# Patient Record
Sex: MALE | Race: WHITE | Employment: OTHER | URBAN - METROPOLITAN AREA
[De-identification: names, ages, dates, MRNs, and addresses within clinical notes are randomized per-mention and may not be internally consistent; named-entity substitution may affect disease eponyms.]

---

## 2017-10-26 ENCOUNTER — TELEPHONE (OUTPATIENT)
Dept: CARDIOLOGY CLINIC | Age: 67
End: 2017-10-26

## 2017-10-26 DIAGNOSIS — N28.9 RENAL INSUFFICIENCY: Chronic | ICD-10-CM

## 2017-10-26 DIAGNOSIS — E78.5 HYPERLIPIDEMIA, UNSPECIFIED HYPERLIPIDEMIA TYPE: Primary | Chronic | ICD-10-CM

## 2017-12-01 ENCOUNTER — OFFICE VISIT (OUTPATIENT)
Dept: CARDIOLOGY CLINIC | Age: 67
End: 2017-12-01

## 2017-12-01 VITALS
DIASTOLIC BLOOD PRESSURE: 70 MMHG | WEIGHT: 192.8 LBS | SYSTOLIC BLOOD PRESSURE: 124 MMHG | HEART RATE: 60 BPM | HEIGHT: 68 IN | BODY MASS INDEX: 29.22 KG/M2 | OXYGEN SATURATION: 97 %

## 2017-12-01 DIAGNOSIS — I25.10 CORONARY ARTERY DISEASE DUE TO LIPID RICH PLAQUE: Primary | ICD-10-CM

## 2017-12-01 DIAGNOSIS — Z95.5 STATUS POST INSERTION OF DRUG-ELUTING STENT INTO LEFT ANTERIOR DESCENDING ARTERY: ICD-10-CM

## 2017-12-01 DIAGNOSIS — Z95.5 STATUS POST INSERTION OF DRUG-ELUTING STENT INTO RIGHT CORONARY ARTERY FOR CORONARY ARTERY DISEASE: ICD-10-CM

## 2017-12-01 DIAGNOSIS — I25.83 CORONARY ARTERY DISEASE DUE TO LIPID RICH PLAQUE: Primary | ICD-10-CM

## 2017-12-01 DIAGNOSIS — E78.2 MIXED HYPERLIPIDEMIA: ICD-10-CM

## 2017-12-01 PROCEDURE — G8599 NO ASA/ANTIPLAT THER USE RNG: HCPCS | Performed by: INTERNAL MEDICINE

## 2017-12-01 PROCEDURE — 4040F PNEUMOC VAC/ADMIN/RCVD: CPT | Performed by: INTERNAL MEDICINE

## 2017-12-01 PROCEDURE — 3017F COLORECTAL CA SCREEN DOC REV: CPT | Performed by: INTERNAL MEDICINE

## 2017-12-01 PROCEDURE — G8419 CALC BMI OUT NRM PARAM NOF/U: HCPCS | Performed by: INTERNAL MEDICINE

## 2017-12-01 PROCEDURE — 1123F ACP DISCUSS/DSCN MKR DOCD: CPT | Performed by: INTERNAL MEDICINE

## 2017-12-01 PROCEDURE — G8427 DOCREV CUR MEDS BY ELIG CLIN: HCPCS | Performed by: INTERNAL MEDICINE

## 2017-12-01 PROCEDURE — 1036F TOBACCO NON-USER: CPT | Performed by: INTERNAL MEDICINE

## 2017-12-01 PROCEDURE — G8484 FLU IMMUNIZE NO ADMIN: HCPCS | Performed by: INTERNAL MEDICINE

## 2017-12-01 PROCEDURE — 99214 OFFICE O/P EST MOD 30 MIN: CPT | Performed by: INTERNAL MEDICINE

## 2017-12-01 RX ORDER — METOPROLOL SUCCINATE 25 MG/1
TABLET, EXTENDED RELEASE ORAL
Qty: 15 TABLET | Refills: 11
Start: 2017-12-01 | End: 2020-08-19 | Stop reason: ALTCHOICE

## 2017-12-01 RX ORDER — METOPROLOL SUCCINATE 25 MG/1
25 TABLET, EXTENDED RELEASE ORAL DAILY
COMMUNITY
End: 2017-12-01 | Stop reason: SDUPTHER

## 2017-12-01 NOTE — PROGRESS NOTES
(ZYLOPRIM) 300 MG tablet, Take 300 mg by mouth daily , Disp: , Rfl:     tadalafil (CIALIS) 5 MG tablet, Take 5 mg by mouth every other day, Disp: , Rfl:     atorvastatin (LIPITOR) 80 MG tablet, Take 1 tablet by mouth daily, Disp: 90 tablet, Rfl: 3    lisinopril (PRINIVIL;ZESTRIL) 2.5 MG tablet, Take 1 tablet by mouth daily, Disp: 30 tablet, Rfl: 11    RA KRILL  MG CAPS, Take 500 mg by mouth every morning sts takes few times a week, Disp: , Rfl:     aspirin 81 MG EC tablet, Take 1 tablet by mouth daily, Disp: 30 tablet, Rfl: 11    nitroGLYCERIN (NITROSTAT) 0.4 MG SL tablet, Place 1 tablet under the tongue every 5 minutes as needed for Chest pain, Disp: 25 tablet, Rfl: 3     Allergies:  Penicillins     Review of Systems:   A 10 point review of symptoms completed. Pertinent positives identified in the HPI, all other review of symptoms negative. Physical Examination:    Vitals:    12/01/17 1317   BP: 124/70   Pulse: 60   SpO2: 97%        Constitutional and General Appearance: NAD   Respiratory:  · Normal excursion and expansion without use of accessory muscles  · Resp Auscultation: Normal breath sounds without dullness  Cardiovascular:  · The apical impulses not displaced  · JVD is normal  · The carotid upstroke is normal in amplitude and contour without delay or bruit  · Normal S1S2, No S3, No Murmur  · Peripheral pulses are symmetrical and full  · There is no clubbing, cyanosis of the extremities. · No edema  · Pedal Pulses: 2+ and equal   Abdomen:  · No masses or tenderness  · Liver/Spleen: No Abnormalities Noted  Neurological/Psychiatric:  · Alert and oriented in all spheres  · Moves all extremities well  · Exhibits normal gait balance and coordination  · No abnormalities of mood, affect, memory, mentation, or behavior are noted    Echo 11/10/15:  Summary  Left ventricle size is normal. Normal left ventricular wall thickness. Ejection fraction is visually estimated to be 50-55 %.   Normal diastolic

## 2018-10-09 ENCOUNTER — APPOINTMENT (RX ONLY)
Dept: URBAN - METROPOLITAN AREA CLINIC 170 | Facility: CLINIC | Age: 68
Setting detail: DERMATOLOGY
End: 2018-10-09

## 2018-10-09 DIAGNOSIS — Z85.828 PERSONAL HISTORY OF OTHER MALIGNANT NEOPLASM OF SKIN: ICD-10-CM

## 2018-10-09 DIAGNOSIS — L81.4 OTHER MELANIN HYPERPIGMENTATION: ICD-10-CM

## 2018-10-09 DIAGNOSIS — D22 MELANOCYTIC NEVI: ICD-10-CM

## 2018-10-09 DIAGNOSIS — L82.1 OTHER SEBORRHEIC KERATOSIS: ICD-10-CM

## 2018-10-09 DIAGNOSIS — D18.0 HEMANGIOMA: ICD-10-CM

## 2018-10-09 PROBLEM — E78.5 HYPERLIPIDEMIA, UNSPECIFIED: Status: ACTIVE | Noted: 2018-10-09

## 2018-10-09 PROBLEM — D22.5 MELANOCYTIC NEVI OF TRUNK: Status: ACTIVE | Noted: 2018-10-09

## 2018-10-09 PROBLEM — D18.01 HEMANGIOMA OF SKIN AND SUBCUTANEOUS TISSUE: Status: ACTIVE | Noted: 2018-10-09

## 2018-10-09 PROBLEM — I10 ESSENTIAL (PRIMARY) HYPERTENSION: Status: ACTIVE | Noted: 2018-10-09

## 2018-10-09 PROCEDURE — ? SUNSCREEN RECOMMENDATIONS

## 2018-10-09 PROCEDURE — ? COUNSELING

## 2018-10-09 PROCEDURE — 99213 OFFICE O/P EST LOW 20 MIN: CPT

## 2018-10-09 ASSESSMENT — LOCATION DETAILED DESCRIPTION DERM
LOCATION DETAILED: INFERIOR THORACIC SPINE
LOCATION DETAILED: RIGHT LATERAL ABDOMEN
LOCATION DETAILED: RIGHT MID-UPPER BACK
LOCATION DETAILED: EPIGASTRIC SKIN

## 2018-10-09 ASSESSMENT — LOCATION SIMPLE DESCRIPTION DERM
LOCATION SIMPLE: ABDOMEN
LOCATION SIMPLE: RIGHT UPPER BACK
LOCATION SIMPLE: UPPER BACK

## 2018-10-09 ASSESSMENT — LOCATION ZONE DERM: LOCATION ZONE: TRUNK

## 2018-10-09 NOTE — HPI: EVALUATION OF SKIN LESION(S)
Hpi Title: Evaluation of Skin Lesions
Have Your Spot(S) Been Treated In The Past?: has not been treated
Additional History: Check scalp good

## 2018-10-09 NOTE — PROCEDURE: SUNSCREEN RECOMMENDATIONS
Detail Level: Generalized
General Sunscreen Counseling: I recommended a broad spectrum sunscreen with a SPF of 30 or higher.  I explained that SPF 30 sunscreens block approximately 97 percent of the sun's harmful rays.  Sunscreens should be applied at least 15 minutes prior to expected sun exposure and then every 2 hours after that as long as sun exposure continues. If swimming or exercising sunscreen should be reapplied every 45 minutes to an hour after getting wet or sweating.  I also recommended a lip balm with a sunscreen as well. Sun protective clothing can be used in lieu of sunscreen but must be worn the entire time you are exposed to the sun's rays.

## 2018-12-11 PROBLEM — I10 ESSENTIAL HYPERTENSION: Status: ACTIVE | Noted: 2018-12-11

## 2018-12-11 NOTE — PROGRESS NOTES
drug-eluting stent     Impression:  1. Successful drug-eluting stent implantation into the distal right coronary artery.     Plan:  1. Continue aspirin and Brilinta.     Echo 7/27/15:  Summary  Technically limited study due to lung interface. Normal left ventricle size and wall thickness. The left ventricular systolic function is reduced with an ejection fraction of 40 %. There is apical hypokinesis noted. Mild mitral regurgitation is present. There is mild tricuspid regurgitation with RVSP estimated at 29 mmHg. Trivial pericardial effusion noted.     Cardiac Cath 7/26/15:  Anatomy:   LM-normal  LAD-99% proximal, 70% mid  D2-90% ostial  Cx-40% proximal  OM1- normal   RCA-20% mid, 70% distal  RPDA- normal  LVEF- 45%, PVCs on injection  PCI: LAD 99% -0% proximal with 3.0 x 23 Xience Alpine drug-eluting stent. LAD 70% to 0% with 2.5 x 38 Xience Alpine drug-eluting stent. D2 90% to 20% with a 2.5 x 12 mm trek balloon     Impression:  1. Severe 2 vessel coronary artery disease including critical stenosis of proximal LAD with successful drug-eluting stent implantation ×2.  2. Mild left ventricular systolic dysfunction. 3. Significant stenosis of the distal right coronary artery.     Plan:  1. Aspirin indefinitely. 2. Brilinta for a minimum of 1 year. 3. PCI of the distal RCA in the near future area      ASSESSMENT/PLAN:  1. Coronary artery disease due to lipid rich plaque  2. Status post insertion of drug-eluting stent into left anterior descending artery  3. Status post insertion of drug-eluting stent into right coronary artery for coronary artery disease  ~ 7/26/15 s/p AWMI, s/p GILMER x 2 to prox and mid LAD, EF 45% LVG, EF 40% by Echo  ~ 8/25/18 s/p GILMER to distal RCA, nonobstructive disease of D-2, Lcx, mid RCA.   EF normal per echo 11/2015  ~ ASA, Metoprolol XL, Lisinopril, statin  ~ no exertional chest pain, but had right sided chest tightness about one month ago which sounds musculoskeletal.  anginal

## 2018-12-14 ENCOUNTER — OFFICE VISIT (OUTPATIENT)
Dept: CARDIOLOGY CLINIC | Age: 68
End: 2018-12-14
Payer: MEDICARE

## 2018-12-14 VITALS
BODY MASS INDEX: 30.16 KG/M2 | RESPIRATION RATE: 14 BRPM | HEIGHT: 68 IN | DIASTOLIC BLOOD PRESSURE: 82 MMHG | WEIGHT: 199 LBS | SYSTOLIC BLOOD PRESSURE: 126 MMHG | HEART RATE: 68 BPM

## 2018-12-14 DIAGNOSIS — I10 ESSENTIAL HYPERTENSION: ICD-10-CM

## 2018-12-14 DIAGNOSIS — I25.83 CORONARY ARTERY DISEASE DUE TO LIPID RICH PLAQUE: Primary | ICD-10-CM

## 2018-12-14 DIAGNOSIS — Z95.5 STATUS POST INSERTION OF DRUG-ELUTING STENT INTO LEFT ANTERIOR DESCENDING ARTERY: ICD-10-CM

## 2018-12-14 DIAGNOSIS — E78.2 MIXED HYPERLIPIDEMIA: ICD-10-CM

## 2018-12-14 DIAGNOSIS — I25.10 CORONARY ARTERY DISEASE DUE TO LIPID RICH PLAQUE: Primary | ICD-10-CM

## 2018-12-14 DIAGNOSIS — Z95.5 STATUS POST INSERTION OF DRUG-ELUTING STENT INTO RIGHT CORONARY ARTERY FOR CORONARY ARTERY DISEASE: ICD-10-CM

## 2018-12-14 PROCEDURE — 1123F ACP DISCUSS/DSCN MKR DOCD: CPT | Performed by: INTERNAL MEDICINE

## 2018-12-14 PROCEDURE — 93000 ELECTROCARDIOGRAM COMPLETE: CPT | Performed by: INTERNAL MEDICINE

## 2018-12-14 PROCEDURE — 3017F COLORECTAL CA SCREEN DOC REV: CPT | Performed by: INTERNAL MEDICINE

## 2018-12-14 PROCEDURE — G8417 CALC BMI ABV UP PARAM F/U: HCPCS | Performed by: INTERNAL MEDICINE

## 2018-12-14 PROCEDURE — G8484 FLU IMMUNIZE NO ADMIN: HCPCS | Performed by: INTERNAL MEDICINE

## 2018-12-14 PROCEDURE — G8427 DOCREV CUR MEDS BY ELIG CLIN: HCPCS | Performed by: INTERNAL MEDICINE

## 2018-12-14 PROCEDURE — 99214 OFFICE O/P EST MOD 30 MIN: CPT | Performed by: INTERNAL MEDICINE

## 2018-12-14 PROCEDURE — 1036F TOBACCO NON-USER: CPT | Performed by: INTERNAL MEDICINE

## 2018-12-14 PROCEDURE — 1101F PT FALLS ASSESS-DOCD LE1/YR: CPT | Performed by: INTERNAL MEDICINE

## 2018-12-14 PROCEDURE — G8599 NO ASA/ANTIPLAT THER USE RNG: HCPCS | Performed by: INTERNAL MEDICINE

## 2018-12-14 PROCEDURE — 4040F PNEUMOC VAC/ADMIN/RCVD: CPT | Performed by: INTERNAL MEDICINE

## 2018-12-14 RX ORDER — GLUCOSAM/CHON-MSM1/C/MANG/BOSW 750-644 MG
TABLET ORAL
COMMUNITY

## 2018-12-14 RX ORDER — VITAMIN B COMPLEX
1 CAPSULE ORAL DAILY
COMMUNITY

## 2018-12-14 RX ORDER — ACETAMINOPHEN 160 MG
TABLET,DISINTEGRATING ORAL
COMMUNITY

## 2018-12-14 RX ORDER — AZELASTINE 1 MG/ML
1 SPRAY, METERED NASAL 2 TIMES DAILY PRN
COMMUNITY

## 2018-12-14 RX ORDER — VITAMIN B COMPLEX
TABLET ORAL
COMMUNITY

## 2018-12-14 NOTE — PATIENT INSTRUCTIONS
1.  Okay to reduce Atorvastatin to 40 mg daily but recommend NMR Lipoprotein in three months  2. Agree with nephrology consult  3. Follow up in one year if his symptoms remain stable.

## 2018-12-27 ENCOUNTER — HOSPITAL ENCOUNTER (OUTPATIENT)
Age: 68
Discharge: HOME | End: 2018-12-27
Payer: MEDICARE

## 2018-12-27 ENCOUNTER — HOSPITAL ENCOUNTER (OUTPATIENT)
Dept: ULTRASOUND IMAGING | Age: 68
Discharge: HOME | End: 2018-12-27
Payer: MEDICARE

## 2018-12-27 DIAGNOSIS — N18.30 CHRONIC KIDNEY DISEASE, STAGE 3 (HCC): ICD-10-CM

## 2018-12-27 PROCEDURE — 76770 US EXAM ABDO BACK WALL COMP: CPT

## 2018-12-29 ENCOUNTER — HOSPITAL ENCOUNTER (OUTPATIENT)
Age: 68
Discharge: HOME | End: 2018-12-29
Payer: MEDICARE

## 2018-12-29 LAB
24HR URINE VOLUME (ML): 2000 ML
ANION GAP SERPL CALCULATED.3IONS-SCNC: 12 MMOL/L (ref 3–16)
BUN BLDV-MCNC: 26 MG/DL (ref 7–20)
CALCIUM 24 HOUR URINE: 446 MG/24 HR (ref 42–353)
CALCIUM SERPL-MCNC: 9 MG/DL (ref 8.3–10.6)
CHLORIDE BLD-SCNC: 103 MMOL/L (ref 99–110)
CO2: 24 MMOL/L (ref 21–32)
CREAT SERPL-MCNC: 1.6 MG/DL (ref 0.8–1.3)
CREAT SERPL-MCNC: 1.7 MG/DL (ref 0.8–1.3)
CREATININE 24 HOUR URINE: 2.1 G/24HR (ref 0.6–2.5)
CREATININE CLEARANCE: 78 ML/MIN (ref 100–140)
CREATININE URINE: 104.2 MG/DL (ref 39–259)
GFR AFRICAN AMERICAN: 49
GFR NON-AFRICAN AMERICAN: 40
GLUCOSE BLD-MCNC: 90 MG/DL (ref 70–99)
Lab: 24 HR
MICROALBUMIN UR-MCNC: <1.2 MG/DL
MICROALBUMIN/CREAT UR-RTO: NORMAL MG/G (ref 0–30)
PARATHYROID HORMONE INTACT: 18.6 PG/ML (ref 14–72)
POTASSIUM SERPL-SCNC: 4.7 MMOL/L (ref 3.5–5.1)
PROTEIN 24 HOUR URINE: 0.08 G/24HR
PROTEIN PROTEIN: 0.01 G/DL
PROTEIN PROTEIN: 5 MG/DL
SODIUM 24 HOUR URINE: 212 MMOL/24 HR (ref 40–220)
SODIUM BLD-SCNC: 139 MMOL/L (ref 136–145)
URIC ACID, SERUM: 5.1 MG/DL (ref 3.5–7.2)
VITAMIN D 25-HYDROXY: 48.6 NG/ML

## 2018-12-29 PROCEDURE — 80048 BASIC METABOLIC PNL TOTAL CA: CPT

## 2018-12-29 PROCEDURE — 84550 ASSAY OF BLOOD/URIC ACID: CPT

## 2018-12-29 PROCEDURE — 82507 ASSAY OF CITRATE: CPT

## 2018-12-29 PROCEDURE — 84300 ASSAY OF URINE SODIUM: CPT

## 2018-12-29 PROCEDURE — 83970 ASSAY OF PARATHORMONE: CPT

## 2018-12-29 PROCEDURE — 84165 PROTEIN E-PHORESIS SERUM: CPT

## 2018-12-29 PROCEDURE — 84156 ASSAY OF PROTEIN URINE: CPT

## 2018-12-29 PROCEDURE — 82306 VITAMIN D 25 HYDROXY: CPT

## 2018-12-29 PROCEDURE — 36415 COLL VENOUS BLD VENIPUNCTURE: CPT

## 2018-12-29 PROCEDURE — 82340 ASSAY OF CALCIUM IN URINE: CPT

## 2018-12-29 PROCEDURE — 86335 IMMUNFIX E-PHORSIS/URINE/CSF: CPT

## 2018-12-29 PROCEDURE — 86334 IMMUNOFIX E-PHORESIS SERUM: CPT

## 2018-12-29 PROCEDURE — 82575 CREATININE CLEARANCE TEST: CPT

## 2018-12-29 PROCEDURE — 84155 ASSAY OF PROTEIN SERUM: CPT

## 2018-12-29 PROCEDURE — 83945 ASSAY OF OXALATE: CPT

## 2018-12-29 PROCEDURE — 82570 ASSAY OF URINE CREATININE: CPT

## 2018-12-29 PROCEDURE — 82043 UR ALBUMIN QUANTITATIVE: CPT

## 2018-12-29 PROCEDURE — 84166 PROTEIN E-PHORESIS/URINE/CSF: CPT

## 2018-12-31 LAB
ALBUMIN SERPL-MCNC: 3.8 G/DL (ref 3.1–4.9)
ALPHA-1-GLOBULIN: 0.2 G/DL (ref 0.2–0.4)
ALPHA-2-GLOBULIN: 0.8 G/DL (ref 0.4–1.1)
BETA GLOBULIN: 1 G/DL (ref 0.9–1.6)
GAMMA GLOBULIN: 1.2 G/DL (ref 0.6–1.8)
SPE/IFE INTERPRETATION: NORMAL
TOTAL PROTEIN: 7 G/DL (ref 6.4–8.2)

## 2019-01-01 LAB
CITRIC ACID, U, 24HR: 616 MG/D (ref 320–1240)
CITRIC ACID, URINE: 308 MG/L
CITRIC ACID/CREATININE RATIO URINE: 308 MG/G
CREATININE 24 HOUR URINE: 2000 MG/D (ref 800–2100)
CREATININE URINE: 100 MG/DL
HOURS COLLECTED: 24 HR
MISCELLANEOUS LAB TEST ORDER: NORMAL
OXALATES, URINE 24HR: 36 MG/D (ref 16–49)
OXALATES, URINE: 18 MG/L
URINE TOTAL VOLUME: 2000 ML

## 2019-01-04 LAB — URINE ELECTROPHORESIS INTERP: NORMAL

## 2019-10-07 ENCOUNTER — TELEPHONE (OUTPATIENT)
Dept: CARDIOLOGY CLINIC | Age: 69
End: 2019-10-07

## 2019-12-10 ENCOUNTER — OFFICE VISIT (OUTPATIENT)
Dept: CARDIOLOGY CLINIC | Age: 69
End: 2019-12-10
Payer: MEDICARE

## 2019-12-10 VITALS
SYSTOLIC BLOOD PRESSURE: 108 MMHG | BODY MASS INDEX: 29.4 KG/M2 | HEIGHT: 68 IN | OXYGEN SATURATION: 97 % | DIASTOLIC BLOOD PRESSURE: 66 MMHG | HEART RATE: 62 BPM | WEIGHT: 194 LBS

## 2019-12-10 DIAGNOSIS — Z95.5 STATUS POST INSERTION OF DRUG-ELUTING STENT INTO RIGHT CORONARY ARTERY FOR CORONARY ARTERY DISEASE: ICD-10-CM

## 2019-12-10 DIAGNOSIS — I25.10 CORONARY ARTERY DISEASE DUE TO LIPID RICH PLAQUE: Primary | ICD-10-CM

## 2019-12-10 DIAGNOSIS — Z95.5 STATUS POST INSERTION OF DRUG-ELUTING STENT INTO LEFT ANTERIOR DESCENDING ARTERY: ICD-10-CM

## 2019-12-10 DIAGNOSIS — I10 ESSENTIAL HYPERTENSION: ICD-10-CM

## 2019-12-10 DIAGNOSIS — R07.2 PRECORDIAL PAIN: ICD-10-CM

## 2019-12-10 DIAGNOSIS — I25.83 CORONARY ARTERY DISEASE DUE TO LIPID RICH PLAQUE: Primary | ICD-10-CM

## 2019-12-10 DIAGNOSIS — E78.2 MIXED HYPERLIPIDEMIA: ICD-10-CM

## 2019-12-10 PROCEDURE — 99214 OFFICE O/P EST MOD 30 MIN: CPT | Performed by: INTERNAL MEDICINE

## 2019-12-10 PROCEDURE — 3017F COLORECTAL CA SCREEN DOC REV: CPT | Performed by: INTERNAL MEDICINE

## 2019-12-10 PROCEDURE — G8484 FLU IMMUNIZE NO ADMIN: HCPCS | Performed by: INTERNAL MEDICINE

## 2019-12-10 PROCEDURE — 1123F ACP DISCUSS/DSCN MKR DOCD: CPT | Performed by: INTERNAL MEDICINE

## 2019-12-10 PROCEDURE — G8599 NO ASA/ANTIPLAT THER USE RNG: HCPCS | Performed by: INTERNAL MEDICINE

## 2019-12-10 PROCEDURE — 4040F PNEUMOC VAC/ADMIN/RCVD: CPT | Performed by: INTERNAL MEDICINE

## 2019-12-10 PROCEDURE — G8417 CALC BMI ABV UP PARAM F/U: HCPCS | Performed by: INTERNAL MEDICINE

## 2019-12-10 PROCEDURE — 1036F TOBACCO NON-USER: CPT | Performed by: INTERNAL MEDICINE

## 2019-12-10 PROCEDURE — G8427 DOCREV CUR MEDS BY ELIG CLIN: HCPCS | Performed by: INTERNAL MEDICINE

## 2020-08-14 ENCOUNTER — APPOINTMENT (RX ONLY)
Dept: URBAN - METROPOLITAN AREA CLINIC 170 | Facility: CLINIC | Age: 70
Setting detail: DERMATOLOGY
End: 2020-08-14

## 2020-08-14 DIAGNOSIS — D18.0 HEMANGIOMA: ICD-10-CM

## 2020-08-14 DIAGNOSIS — L81.4 OTHER MELANIN HYPERPIGMENTATION: ICD-10-CM

## 2020-08-14 DIAGNOSIS — Z85.828 PERSONAL HISTORY OF OTHER MALIGNANT NEOPLASM OF SKIN: ICD-10-CM

## 2020-08-14 DIAGNOSIS — L57.0 ACTINIC KERATOSIS: ICD-10-CM

## 2020-08-14 DIAGNOSIS — D22 MELANOCYTIC NEVI: ICD-10-CM

## 2020-08-14 DIAGNOSIS — L82.1 OTHER SEBORRHEIC KERATOSIS: ICD-10-CM

## 2020-08-14 PROBLEM — D18.01 HEMANGIOMA OF SKIN AND SUBCUTANEOUS TISSUE: Status: ACTIVE | Noted: 2020-08-14

## 2020-08-14 PROBLEM — D22.5 MELANOCYTIC NEVI OF TRUNK: Status: ACTIVE | Noted: 2020-08-14

## 2020-08-14 PROCEDURE — ? FULL BODY SKIN EXAM

## 2020-08-14 PROCEDURE — ? LIQUID NITROGEN

## 2020-08-14 PROCEDURE — 17000 DESTRUCT PREMALG LESION: CPT

## 2020-08-14 PROCEDURE — ? SUNSCREEN RECOMMENDATIONS

## 2020-08-14 PROCEDURE — ? COUNSELING

## 2020-08-14 PROCEDURE — 99213 OFFICE O/P EST LOW 20 MIN: CPT | Mod: 25

## 2020-08-14 PROCEDURE — ? ADDITIONAL NOTES

## 2020-08-14 ASSESSMENT — LOCATION ZONE DERM
LOCATION ZONE: TRUNK
LOCATION ZONE: EAR

## 2020-08-14 ASSESSMENT — LOCATION DETAILED DESCRIPTION DERM
LOCATION DETAILED: LEFT INFERIOR HELIX
LOCATION DETAILED: INFERIOR THORACIC SPINE
LOCATION DETAILED: RIGHT MID-UPPER BACK
LOCATION DETAILED: RIGHT LATERAL ABDOMEN
LOCATION DETAILED: EPIGASTRIC SKIN

## 2020-08-14 ASSESSMENT — LOCATION SIMPLE DESCRIPTION DERM
LOCATION SIMPLE: UPPER BACK
LOCATION SIMPLE: RIGHT UPPER BACK
LOCATION SIMPLE: ABDOMEN
LOCATION SIMPLE: LEFT EAR

## 2020-08-14 NOTE — PROCEDURE: MIPS QUALITY
Quality 111:Pneumonia Vaccination Status For Older Adults: Pneumococcal Vaccination Previously Received
Detail Level: Detailed
Quality 130: Documentation Of Current Medications In The Medical Record: Current Medications Documented
Quality 431: Preventive Care And Screening: Unhealthy Alcohol Use - Screening: Patient screened for unhealthy alcohol use using a single question and scores less than 2 times per year
Quality 47: Advance Care Plan: Advance Care Planning discussed and documented; advance care plan or surrogate decision maker documented in the medical record.
Quality 110: Preventive Care And Screening: Influenza Immunization: Influenza Immunization Administered during Influenza season
Quality 226: Preventive Care And Screening: Tobacco Use: Screening And Cessation Intervention: Patient screened for tobacco use and is an ex/non-smoker

## 2020-08-14 NOTE — PROCEDURE: LIQUID NITROGEN
Render Post-Care Instructions In Note?: yes
Consent: The patient's consent was obtained including but not limited to risks of crusting, scabbing, blistering, scarring, darker or lighter pigmentary change, recurrence, incomplete removal and infection.
Post-Care Instructions: I reviewed with the patient in detail post-care instructions. Patient is to wear sunprotection, and avoid picking at any of the treated lesions. Pt may apply Vaseline to crusted or scabbing areas.
Detail Level: Detailed
Render Note In Bullet Format When Appropriate: No
Duration Of Freeze Thaw-Cycle (Seconds): 5
Number Of Freeze-Thaw Cycles: 1 freeze-thaw cycle

## 2020-08-14 NOTE — HPI: EVALUATION OF SKIN LESION(S)
What Type Of Note Output Would You Prefer (Optional)?: Bullet Format
Hpi Title: Evaluation of Skin Lesions
How Severe Are Your Spot(S)?: mild
Have Your Spot(S) Been Treated In The Past?: has been treated
Additional History: Check left ear on/off scaly lesion x 1 year.

## 2020-08-18 NOTE — PROGRESS NOTES
Provider, MD Roma Dennison Tea 315 MG CAPS Take 315 mg by mouth daily  Yes Historical Provider, MD   Misc Natural Products (OSTEO BI-FLEX ADV TRIPLE ST) TABS Take by mouth Yes Historical Provider, MD   azelastine (ASTELIN) 0.1 % nasal spray 1 spray by Nasal route 2 times daily as needed Use in each nostril as directed  Yes Historical Provider, MD   fenofibrate 160 MG tablet Take 160 mg by mouth daily  Yes Historical Provider, MD   allopurinol (ZYLOPRIM) 300 MG tablet Take 300 mg by mouth daily  Yes Historical Provider, MD   tadalafil (CIALIS) 5 MG tablet Take 5 mg by mouth every other day Yes Historical Provider, MD   atorvastatin (LIPITOR) 80 MG tablet Take 1 tablet by mouth daily Yes Brisa Apodaca MD   lisinopril (PRINIVIL;ZESTRIL) 2.5 MG tablet Take 1 tablet by mouth daily  Patient taking differently: Take 2.5 mg by mouth 2 times daily  Yes Brisa Apodaca MD   aspirin 81 MG EC tablet Take 1 tablet by mouth daily Yes Brisa Apodaca MD   nitroGLYCERIN (NITROSTAT) 0.4 MG SL tablet Place 1 tablet under the tongue every 5 minutes as needed for Chest pain Yes Brisa Apodaca MD        No Known Allergies    Past Medical History:   Diagnosis Date    Anterior wall myocardial infarction ([address]     CAD (coronary artery disease)     Hypertension     Kidney stones     Mitral valve prolapse     Renal insufficiency     Status post insertion of drug-eluting stent into left anterior descending artery     Status post insertion of drug-eluting stent into right coronary artery for coronary artery disease        Past Surgical History:   Procedure Laterality Date    CORONARY ANGIOPLASTY WITH STENT PLACEMENT  7/26/15    Drug-eluting stent ×2 in the LAD    CORONARY ANGIOPLASTY WITH STENT PLACEMENT  8/25/15    Drug-eluting stent ×1 the RCA    LITHOTRIPSY      TONSILLECTOMY         Social History     Tobacco Use    Smoking status: Never Smoker    Smokeless tobacco: Never Used   Substance Use Topics    Alcohol use:  Yes Comment: soc        Family History   Problem Relation Age of Onset    Heart Disease Father        PHYSICAL EXAMINATION:  Vitals:    08/19/20 1320   BP: 126/80   Site: Left Upper Arm   Position: Sitting   Cuff Size: Medium Adult   Pulse: 78   SpO2: 98%   Weight: 190 lb (86.2 kg)   Height: 5' 8\" (1.727 m)     Estimated body mass index is 28.89 kg/m² as calculated from the following:    Height as of this encounter: 5' 8\" (1.727 m). Weight as of this encounter: 190 lb (86.2 kg). Physical Exam  Constitutional:       Appearance: He is well-developed. He is not diaphoretic. HENT:      Head: Normocephalic and atraumatic. Eyes:      General: No scleral icterus. Extraocular Movements: Extraocular movements intact. Conjunctiva/sclera: Conjunctivae normal.   Neck:      Musculoskeletal: Normal range of motion and neck supple. Vascular: No JVD. Cardiovascular:      Rate and Rhythm: Normal rate and regular rhythm. Heart sounds: Normal heart sounds. No murmur. No friction rub. No gallop. Pulmonary:      Effort: Pulmonary effort is normal. No respiratory distress. Breath sounds: Normal breath sounds. No wheezing or rales. Abdominal:      General: Bowel sounds are normal.      Palpations: Abdomen is soft. Tenderness: There is no abdominal tenderness. Musculoskeletal: Normal range of motion. Skin:     General: Skin is warm and dry. Findings: No rash. Neurological:      General: No focal deficit present. Mental Status: He is alert and oriented to person, place, and time. Psychiatric:         Mood and Affect: Mood normal.         Behavior: Behavior normal.         Thought Content: Thought content normal.         Judgment: Judgment normal.           I have reviewed all pertinent lab results and diagnostic testing.         Lab Results   Component Value Date    CHOL 125 09/26/2015    TRIG 103 09/26/2015    HDL 30 09/26/2015    LDLCALC 74 09/26/2015     Lab Results   Component Value Date     12/29/2018    K 4.7 12/29/2018     12/29/2018    CO2 24 12/29/2018    GLUCOSE 90 12/29/2018    BUN 26 12/29/2018    CREATININE 1.6 12/29/2018    CALCIUM 9.0 12/29/2018    GFR 51 03/05/2011    GFRAA 49 12/29/2018    GFRAA >60 03/05/2011     Lab Results   Component Value Date    ALT 45 09/26/2015    AST 37 09/26/2015       ECG 12/14/18: SB, 57 bpm, PRWP    Echo 11/10/15:  Summary  Left ventricle size is normal. Normal left ventricular wall thickness. Ejection fraction is visually estimated to be 50-55 %. Normal diastolic filling pattern for age. Mild mitral regurgitation is present. Mild aortic regurgitation is present. There is mild tricuspid regurgitation with RVSP estimated at 27 mmHg.     Cardiac Cath 8/25/15:  Anatomy:   LM-normal  LAD-widely patent mid stents  D2-20% ostial  Cx-40% proximal  OM1- normal  RCA-20% mid, 80% distal  RPDA- normal  LVEF- not done  PCI: Distal RCA 80% to 0% with a 3.0 mm x 12 mm Xience Alpine drug-eluting stent     Impression:  1. Successful drug-eluting stent implantation into the distal right coronary artery.     Plan:  1. Continue aspirin and Brilinta.     Echo 7/27/15:  Summary  Technically limited study due to lung interface. Normal left ventricle size and wall thickness. The left ventricular systolic function is reduced with an ejection fraction of 40 %. There is apical hypokinesis noted. Mild mitral regurgitation is present. There is mild tricuspid regurgitation with RVSP estimated at 29 mmHg. Trivial pericardial effusion noted.     Cardiac Cath 7/26/15:  Anatomy:   LM-normal  LAD-99% proximal, 70% mid  D2-90% ostial  Cx-40% proximal  OM1- normal   RCA-20% mid, 70% distal  RPDA- normal  LVEF- 45%, PVCs on injection  PCI: LAD 99% -0% proximal with 3.0 x 23 Xience Alpine drug-eluting stent. LAD 70% to 0% with 2.5 x 38 Xience Alpine drug-eluting stent. D2 90% to 20% with a 2.5 x 12 mm trek balloon     Impression:  1.  Severe 2 vessel coronary artery disease including critical stenosis of proximal LAD with successful drug-eluting stent implantation ×2.  2. Mild left ventricular systolic dysfunction. 3. Significant stenosis of the distal right coronary artery.     Plan:  1. Aspirin indefinitely. 2. Brilinta for a minimum of 1 year. 3. PCI of the distal RCA in the near future area        ASSESSMENT/PLAN:  1. Coronary artery disease due to lipid rich plaque  2. Status post insertion of drug-eluting stent into left anterior descending artery  3. Status post insertion of drug-eluting stent into right coronary artery for coronary artery disease  ~ 7/26/15 s/p AWMI, s/p GILMER x 2 to prox and mid LAD, EF 45% per LVG  ~ 7/27/15 Echo - EF 40%  ~ 8/25/15 s/p GILMER to distal RCA, nonobstructive disease of D-2, Lcx, mid RCA.    ~ 11/2015 Echo - EF 50-55%. No evidence of hypertrophic cardiomyopathy (younger brother diagnosed in 2019)   ~ ASA, Lisinopril, statin. Plan: Atypical chest discomfort which appears to be musculoskeletal in etiology and is improving with steroid therapy. Normal ECG. No ischemic work-up necessary. 4. Mixed hyperlipidemia  ~ tx with Atorvastatin 80 mg, Fenofibrate 160 mg daily. He has very infrequent muscle cramps which he and Dr. Ember Martienz think is not related to statin therapy.    ~ Labs per Dr. Ember Martinez 5/2020 --  TC- 134, TG- 110, HDL- 43, LDL- 71. Liver enzymes stable from 10/2019      Plan: Lipid profile at goal.  Continue current statin and fibroid dosing. 5. Essential hypertension  ~ tx with Metoprolol and Lisinporil.    ~ 10/2018 K+ 4.6, Creat 1.89.  12/2018 Creat 1.6 (Referred to nephrology - Dr. Lakisha Contreras)  ~ 5/2020 K+ 4.8, Creat 1.9  ~  Blood pressure 126/80, pulse 78, height 5' 8\" (1.727 m), weight 190 lb (86.2 kg), SpO2 98 %. Plan: Well controlled. Continue current antihypertensive regimen. Lisinopril was increased to 2.5 mg twice daily. Metoprolol discontinued. 6.  Palpitations    Plan: New.  30 day MCOT. Plan:  1.  30 day MCOT. 2.  RTC in 1 year. Scribe's Attestation: This note was scribed in the presence of Dr. Nicho Encarnacion MD by Willem Berry RN. Physician Attestation: The scribe's documentation has been prepared under my direction and personally reviewed by me in its entirety. I confirm that the note above accurately reflects all work, treatment, procedures, and medical decision making performed by me. An  electronic signature was used to authenticate this note. Keon Encarnacion MD, [facility] [place], [address]

## 2020-08-19 ENCOUNTER — OFFICE VISIT (OUTPATIENT)
Dept: CARDIOLOGY CLINIC | Age: 70
End: 2020-08-19
Payer: MEDICARE

## 2020-08-19 VITALS
HEIGHT: 68 IN | OXYGEN SATURATION: 98 % | BODY MASS INDEX: 28.79 KG/M2 | HEART RATE: 78 BPM | WEIGHT: 190 LBS | SYSTOLIC BLOOD PRESSURE: 126 MMHG | DIASTOLIC BLOOD PRESSURE: 80 MMHG

## 2020-08-19 PROCEDURE — 3017F COLORECTAL CA SCREEN DOC REV: CPT | Performed by: INTERNAL MEDICINE

## 2020-08-19 PROCEDURE — 99214 OFFICE O/P EST MOD 30 MIN: CPT | Performed by: INTERNAL MEDICINE

## 2020-08-19 PROCEDURE — G8417 CALC BMI ABV UP PARAM F/U: HCPCS | Performed by: INTERNAL MEDICINE

## 2020-08-19 PROCEDURE — 1123F ACP DISCUSS/DSCN MKR DOCD: CPT | Performed by: INTERNAL MEDICINE

## 2020-08-19 PROCEDURE — G8427 DOCREV CUR MEDS BY ELIG CLIN: HCPCS | Performed by: INTERNAL MEDICINE

## 2020-08-19 PROCEDURE — 4040F PNEUMOC VAC/ADMIN/RCVD: CPT | Performed by: INTERNAL MEDICINE

## 2020-08-19 PROCEDURE — 93000 ELECTROCARDIOGRAM COMPLETE: CPT | Performed by: INTERNAL MEDICINE

## 2020-08-19 PROCEDURE — 1036F TOBACCO NON-USER: CPT | Performed by: INTERNAL MEDICINE

## 2020-08-19 RX ORDER — LANOLIN ALCOHOL/MO/W.PET/CERES
400 CREAM (GRAM) TOPICAL DAILY
COMMUNITY

## 2020-09-29 PROCEDURE — 93228 REMOTE 30 DAY ECG REV/REPORT: CPT | Performed by: INTERNAL MEDICINE

## 2020-10-05 ENCOUNTER — TELEPHONE (OUTPATIENT)
Dept: CARDIOLOGY CLINIC | Age: 70
End: 2020-10-05

## 2021-08-26 ENCOUNTER — APPOINTMENT (RX ONLY)
Dept: URBAN - METROPOLITAN AREA CLINIC 170 | Facility: CLINIC | Age: 71
Setting detail: DERMATOLOGY
End: 2021-08-26

## 2021-08-26 DIAGNOSIS — D18.0 HEMANGIOMA: ICD-10-CM | Status: STABLE

## 2021-08-26 DIAGNOSIS — L57.0 ACTINIC KERATOSIS: ICD-10-CM

## 2021-08-26 DIAGNOSIS — L81.4 OTHER MELANIN HYPERPIGMENTATION: ICD-10-CM | Status: STABLE

## 2021-08-26 DIAGNOSIS — L82.1 OTHER SEBORRHEIC KERATOSIS: ICD-10-CM | Status: STABLE

## 2021-08-26 DIAGNOSIS — Z85.828 PERSONAL HISTORY OF OTHER MALIGNANT NEOPLASM OF SKIN: ICD-10-CM

## 2021-08-26 DIAGNOSIS — D22 MELANOCYTIC NEVI: ICD-10-CM | Status: STABLE

## 2021-08-26 PROBLEM — D22.5 MELANOCYTIC NEVI OF TRUNK: Status: ACTIVE | Noted: 2021-08-26

## 2021-08-26 PROBLEM — D18.01 HEMANGIOMA OF SKIN AND SUBCUTANEOUS TISSUE: Status: ACTIVE | Noted: 2021-08-26

## 2021-08-26 PROCEDURE — 17000 DESTRUCT PREMALG LESION: CPT

## 2021-08-26 PROCEDURE — ? COUNSELING

## 2021-08-26 PROCEDURE — 99213 OFFICE O/P EST LOW 20 MIN: CPT | Mod: 25

## 2021-08-26 PROCEDURE — ? ADDITIONAL NOTES

## 2021-08-26 PROCEDURE — ? FULL BODY SKIN EXAM

## 2021-08-26 PROCEDURE — ? LIQUID NITROGEN

## 2021-08-26 PROCEDURE — ? TREATMENT REGIMEN

## 2021-08-26 ASSESSMENT — LOCATION DETAILED DESCRIPTION DERM
LOCATION DETAILED: INFERIOR THORACIC SPINE
LOCATION DETAILED: RIGHT MID-UPPER BACK
LOCATION DETAILED: EPIGASTRIC SKIN
LOCATION DETAILED: LEFT SUPERIOR HELIX
LOCATION DETAILED: RIGHT LATERAL ABDOMEN

## 2021-08-26 ASSESSMENT — LOCATION SIMPLE DESCRIPTION DERM
LOCATION SIMPLE: ABDOMEN
LOCATION SIMPLE: UPPER BACK
LOCATION SIMPLE: RIGHT UPPER BACK
LOCATION SIMPLE: LEFT EAR

## 2021-08-26 ASSESSMENT — LOCATION ZONE DERM
LOCATION ZONE: EAR
LOCATION ZONE: TRUNK

## 2021-08-26 NOTE — PROCEDURE: LIQUID NITROGEN
Render Note In Bullet Format When Appropriate: No
Duration Of Freeze Thaw-Cycle (Seconds): 5
Show Applicator Variable?: Yes
Consent: The patient's consent was obtained including but not limited to risks of crusting, scabbing, blistering, scarring, darker or lighter pigmentary change, recurrence, incomplete removal and infection.
Post-Care Instructions: I reviewed with the patient in detail post-care instructions. Patient is to wear sunprotection, and avoid picking at any of the treated lesions. Pt may apply Vaseline to crusted or scabbing areas.
Detail Level: Detailed
Number Of Freeze-Thaw Cycles: 1 freeze-thaw cycle

## 2022-08-26 ENCOUNTER — APPOINTMENT (RX ONLY)
Dept: URBAN - METROPOLITAN AREA CLINIC 170 | Facility: CLINIC | Age: 72
Setting detail: DERMATOLOGY
End: 2022-08-26

## 2022-08-26 DIAGNOSIS — D18.0 HEMANGIOMA: ICD-10-CM

## 2022-08-26 DIAGNOSIS — D22 MELANOCYTIC NEVI: ICD-10-CM

## 2022-08-26 DIAGNOSIS — L81.4 OTHER MELANIN HYPERPIGMENTATION: ICD-10-CM

## 2022-08-26 DIAGNOSIS — L82.1 OTHER SEBORRHEIC KERATOSIS: ICD-10-CM

## 2022-08-26 DIAGNOSIS — Z85.828 PERSONAL HISTORY OF OTHER MALIGNANT NEOPLASM OF SKIN: ICD-10-CM

## 2022-08-26 PROBLEM — D22.5 MELANOCYTIC NEVI OF TRUNK: Status: ACTIVE | Noted: 2022-08-26

## 2022-08-26 PROBLEM — D18.01 HEMANGIOMA OF SKIN AND SUBCUTANEOUS TISSUE: Status: ACTIVE | Noted: 2022-08-26

## 2022-08-26 PROCEDURE — ? FULL BODY SKIN EXAM

## 2022-08-26 PROCEDURE — ? ADDITIONAL NOTES

## 2022-08-26 PROCEDURE — 99213 OFFICE O/P EST LOW 20 MIN: CPT

## 2022-08-26 PROCEDURE — ? COUNSELING

## 2022-08-26 PROCEDURE — ? TREATMENT REGIMEN

## 2022-08-26 ASSESSMENT — LOCATION SIMPLE DESCRIPTION DERM
LOCATION SIMPLE: UPPER BACK
LOCATION SIMPLE: RIGHT UPPER BACK
LOCATION SIMPLE: ABDOMEN

## 2022-08-26 ASSESSMENT — LOCATION DETAILED DESCRIPTION DERM
LOCATION DETAILED: RIGHT LATERAL ABDOMEN
LOCATION DETAILED: RIGHT MID-UPPER BACK
LOCATION DETAILED: EPIGASTRIC SKIN
LOCATION DETAILED: INFERIOR THORACIC SPINE

## 2022-08-26 ASSESSMENT — LOCATION ZONE DERM: LOCATION ZONE: TRUNK

## 2022-08-26 NOTE — HPI: EVALUATION OF SKIN LESION(S)
What Type Of Note Output Would You Prefer (Optional)?: Bullet Format
Hpi Title: Evaluation of Skin Lesions
How Severe Are Your Spot(S)?: mild
Have Your Spot(S) Been Treated In The Past?: has been treated
Additional History: Pt complains of a lesion on his right posterior thigh that has been there for 3 months. He states that it is a scab and he is scared to pick at it. He denies previous treatment.

## 2022-08-26 NOTE — PROCEDURE: MIPS QUALITY
Quality 110: Preventive Care And Screening: Influenza Immunization: Influenza Immunization Administered during Influenza season
Quality 226: Preventive Care And Screening: Tobacco Use: Screening And Cessation Intervention: Patient screened for tobacco use and is an ex/non-smoker
Detail Level: Detailed
Quality 111:Pneumonia Vaccination Status For Older Adults: Pneumococcal vaccine administered on or after patient’s 60th birthday and before the end of the measurement period
Quality 130: Documentation Of Current Medications In The Medical Record: Current Medications Documented
Quality 431: Preventive Care And Screening: Unhealthy Alcohol Use - Screening: Patient not identified as an unhealthy alcohol user when screened for unhealthy alcohol use using a systematic screening method
Quality 47: Advance Care Plan: Advance Care Planning discussed and documented; advance care plan or surrogate decision maker documented in the medical record.

## 2023-10-12 NOTE — PROCEDURE: FULL BODY SKIN EXAM
Instructions: This plan will send the code FBSE to the PM system.  DO NOT or CHANGE the price.
Price (Do Not Change): 0.00
Detail Level: Simple
Cardiac Monitor/Defib/ACLS/Rescue Kit/O2/BVM/pulse ox

## 2024-09-27 ENCOUNTER — OFFICE VISIT (OUTPATIENT)
Dept: CARDIOLOGY CLINIC | Age: 74
End: 2024-09-27

## 2024-09-27 VITALS
BODY MASS INDEX: 29.66 KG/M2 | OXYGEN SATURATION: 98 % | HEIGHT: 67 IN | HEART RATE: 72 BPM | SYSTOLIC BLOOD PRESSURE: 130 MMHG | DIASTOLIC BLOOD PRESSURE: 60 MMHG | WEIGHT: 189 LBS

## 2024-09-27 DIAGNOSIS — I25.10 CORONARY ARTERY DISEASE INVOLVING NATIVE CORONARY ARTERY OF NATIVE HEART WITHOUT ANGINA PECTORIS: Primary | Chronic | ICD-10-CM

## 2024-09-27 DIAGNOSIS — I25.83 CORONARY ARTERY DISEASE DUE TO LIPID RICH PLAQUE: ICD-10-CM

## 2024-09-27 DIAGNOSIS — Z95.5 STATUS POST INSERTION OF DRUG-ELUTING STENT INTO RIGHT CORONARY ARTERY FOR CORONARY ARTERY DISEASE: ICD-10-CM

## 2024-09-27 DIAGNOSIS — E78.2 MIXED HYPERLIPIDEMIA: ICD-10-CM

## 2024-09-27 DIAGNOSIS — I25.10 CORONARY ARTERY DISEASE DUE TO LIPID RICH PLAQUE: ICD-10-CM

## 2024-09-27 DIAGNOSIS — I10 ESSENTIAL HYPERTENSION: ICD-10-CM

## 2024-09-27 RX ORDER — EZETIMIBE 10 MG/1
10 TABLET ORAL DAILY
Qty: 30 TABLET | Refills: 1 | Status: SHIPPED | OUTPATIENT
Start: 2024-09-27 | End: 2024-09-27

## 2024-11-15 ENCOUNTER — HOSPITAL ENCOUNTER (OUTPATIENT)
Dept: CARDIOLOGY | Age: 74
Discharge: HOME | End: 2024-11-17
Attending: INTERNAL MEDICINE
Payer: MEDICARE

## 2024-11-15 VITALS
BODY MASS INDEX: 29.66 KG/M2 | DIASTOLIC BLOOD PRESSURE: 80 MMHG | SYSTOLIC BLOOD PRESSURE: 126 MMHG | WEIGHT: 189 LBS | HEIGHT: 67 IN

## 2024-11-15 DIAGNOSIS — I25.10 CORONARY ARTERY DISEASE DUE TO LIPID RICH PLAQUE: ICD-10-CM

## 2024-11-15 DIAGNOSIS — I25.83 CORONARY ARTERY DISEASE DUE TO LIPID RICH PLAQUE: ICD-10-CM

## 2024-11-15 LAB
ECHO AO ROOT DIAM: 3.4 CM
ECHO AO ROOT INDEX: 1.73 CM/M2
ECHO AV CUSP MM: 2.3 CM
ECHO AV PEAK GRADIENT: 10 MMHG
ECHO AV PEAK GRADIENT: 10 MMHG
ECHO AV PEAK VELOCITY: 1.6 M/S
ECHO AV VELOCITY RATIO: 0.69
ECHO BSA: 2.01 M2
ECHO LA AREA 2C: 15 CM2
ECHO LA AREA 4C: 13.7 CM2
ECHO LA DIAMETER INDEX: 1.88 CM/M2
ECHO LA DIAMETER: 3.7 CM
ECHO LA MAJOR AXIS: 4.8 CM
ECHO LA MINOR AXIS: 5.2 CM
ECHO LA TO AORTIC ROOT RATIO: 1.09
ECHO LA VOL BP: 34 ML (ref 18–58)
ECHO LA VOL MOD A2C: 34 ML (ref 18–58)
ECHO LA VOL MOD A4C: 31 ML (ref 18–58)
ECHO LA VOL/BSA BIPLANE: 17 ML/M2 (ref 16–34)
ECHO LA VOLUME INDEX MOD A2C: 17 ML/M2 (ref 16–34)
ECHO LA VOLUME INDEX MOD A4C: 16 ML/M2 (ref 16–34)
ECHO LV E' LATERAL VELOCITY: 11.7 CM/S
ECHO LV E' SEPTAL VELOCITY: 8.16 CM/S
ECHO LV EF PHYSICIAN: 60 %
ECHO LV FRACTIONAL SHORTENING: 28 % (ref 28–44)
ECHO LV GLOBAL LONGITUDINAL STRAIN (GLS): -15.5 %
ECHO LV GLOBAL LONGITUDINAL STRAIN (GLS): -18.1 %
ECHO LV GLOBAL LONGITUDINAL STRAIN (GLS): -18.6 %
ECHO LV GLOBAL LONGITUDINAL STRAIN (GLS): -20.4 %
ECHO LV INTERNAL DIMENSION DIASTOLE INDEX: 2.03 CM/M2
ECHO LV INTERNAL DIMENSION DIASTOLIC: 4 CM (ref 4.2–5.9)
ECHO LV INTERNAL DIMENSION SYSTOLIC INDEX: 1.47 CM/M2
ECHO LV INTERNAL DIMENSION SYSTOLIC: 2.9 CM
ECHO LV ISOVOLUMETRIC RELAXATION TIME (IVRT): 74 MS
ECHO LV IVSD: 0.9 CM (ref 0.6–1)
ECHO LV MASS 2D: 109.7 G (ref 88–224)
ECHO LV MASS INDEX 2D: 55.7 G/M2 (ref 49–115)
ECHO LV POSTERIOR WALL DIASTOLIC: 0.9 CM (ref 0.6–1)
ECHO LV RELATIVE WALL THICKNESS RATIO: 0.45
ECHO LVOT PEAK GRADIENT: 5 MMHG
ECHO LVOT PEAK VELOCITY: 1.1 M/S
ECHO MV A VELOCITY: 1.01 M/S
ECHO MV E DECELERATION TIME (DT): 227 MS
ECHO MV E VELOCITY: 0.57 M/S
ECHO MV E/A RATIO: 0.56
ECHO MV E/E' LATERAL: 4.87
ECHO MV E/E' RATIO (AVERAGED): 5.93
ECHO MV E/E' SEPTAL: 6.99
ECHO RA AREA 4C: 12.5 CM2
ECHO RA END SYSTOLIC VOLUME APICAL 4 CHAMBER INDEX BSA: 13 ML/M2
ECHO RA VOLUME: 26 ML
ECHO RV FREE WALL PEAK S': 18.4 CM/S
ECHO RV TAPSE: 2.3 CM (ref 1.7–?)

## 2024-11-15 PROCEDURE — 93306 TTE W/DOPPLER COMPLETE: CPT

## 2024-11-15 PROCEDURE — 93356 MYOCRD STRAIN IMG SPCKL TRCK: CPT | Performed by: INTERNAL MEDICINE

## 2024-11-15 PROCEDURE — 93306 TTE W/DOPPLER COMPLETE: CPT | Performed by: INTERNAL MEDICINE

## 2025-01-28 NOTE — PROGRESS NOTES
CARDIOLOGY CONSULTATION        Patient Name: Josue Ching  Primary Care physician: Bon Bennett MD    Reason for Referral/Chief Complaint: Josue Ching is a 74 y.o. patient who is referred to cardiology clinic today for evaluation and treatment of coronary artery disease.     History of Present Illness:   Josue Ching 74 y.o. male with a medical history notable for Coronary artery disease,  STEMI with prior PCI LAD and staged PCI RCA 2015, hypertension, hyperlipidemia, and chronic kidney disease.     Patient previously seen by my partner Dr. Roberson.  He is transitioning his care owing to relocation.     Today he presents with his wife, Cordelia.   Last saw Dr. Roberson 9/27/24.   Has moved to the [facility] and needed to see a cardiologist closer to his home.    States his PCP placed him on Farga with a new diagnosis of DM 2 about 6 months ago.  Ended up with yeast and urinary infections as a result and is no longer taking this medication.  He had poor reaction with Beta blocker previously as well. Tolerating lisinopril for chronic kidney disease and cardiac indication. Remains on fenofibrate/Lipitor. He was told LDL remained above goal last he was tested. States he has upcoming lab work and is going to see if diet has helped his numbers.  States prior to his MI he didn't feel right.  He got out of the showed and was flushed and chest felt \"off\".  Wife called 911.   He currently denies chest pain, shortness of breath, palpitations, dizziness or syncope.  He was recently in [place] and walked about 17,000 steps daily with no issue.  He exercises at home walking a treadmill and riding a stationary bicycle.  Minimum of 30 minutes 5-6 days a week.  No limitations with exercise. Non-smoker.       Past Medical History:   has a past medical history of Anterior wall myocardial infarction (HCC), CAD (coronary artery disease), Hypertension, Kidney stones, Mitral valve prolapse, Renal insufficiency, Status

## 2025-01-30 ENCOUNTER — OFFICE VISIT (OUTPATIENT)
Dept: CARDIOLOGY CLINIC | Age: 75
End: 2025-01-30
Payer: MEDICARE

## 2025-01-30 VITALS
BODY MASS INDEX: 30.42 KG/M2 | HEIGHT: 67 IN | OXYGEN SATURATION: 99 % | HEART RATE: 69 BPM | SYSTOLIC BLOOD PRESSURE: 114 MMHG | DIASTOLIC BLOOD PRESSURE: 66 MMHG | WEIGHT: 193.8 LBS

## 2025-01-30 DIAGNOSIS — I25.10 CORONARY ARTERY DISEASE INVOLVING NATIVE CORONARY ARTERY OF NATIVE HEART WITHOUT ANGINA PECTORIS: Primary | ICD-10-CM

## 2025-01-30 DIAGNOSIS — E78.2 MIXED HYPERLIPIDEMIA: ICD-10-CM

## 2025-01-30 DIAGNOSIS — I10 ESSENTIAL HYPERTENSION: ICD-10-CM

## 2025-01-30 DIAGNOSIS — I25.2 HISTORY OF ST ELEVATION MYOCARDIAL INFARCTION (STEMI): ICD-10-CM

## 2025-01-30 DIAGNOSIS — Z82.49 FAMILY HISTORY OF CORONARY ARTERY DISEASE: ICD-10-CM

## 2025-01-30 PROCEDURE — 3017F COLORECTAL CA SCREEN DOC REV: CPT | Performed by: INTERNAL MEDICINE

## 2025-01-30 PROCEDURE — 1036F TOBACCO NON-USER: CPT | Performed by: INTERNAL MEDICINE

## 2025-01-30 PROCEDURE — G8427 DOCREV CUR MEDS BY ELIG CLIN: HCPCS | Performed by: INTERNAL MEDICINE

## 2025-01-30 PROCEDURE — 1123F ACP DISCUSS/DSCN MKR DOCD: CPT | Performed by: INTERNAL MEDICINE

## 2025-01-30 PROCEDURE — 1159F MED LIST DOCD IN RCRD: CPT | Performed by: INTERNAL MEDICINE

## 2025-01-30 PROCEDURE — 3078F DIAST BP <80 MM HG: CPT | Performed by: INTERNAL MEDICINE

## 2025-01-30 PROCEDURE — G8417 CALC BMI ABV UP PARAM F/U: HCPCS | Performed by: INTERNAL MEDICINE

## 2025-01-30 PROCEDURE — 99204 OFFICE O/P NEW MOD 45 MIN: CPT | Performed by: INTERNAL MEDICINE

## 2025-01-30 PROCEDURE — 3074F SYST BP LT 130 MM HG: CPT | Performed by: INTERNAL MEDICINE

## 2025-01-30 NOTE — PATIENT INSTRUCTIONS
PLAN:  Continue to remain active and exercising   Recommend repeat Echocardiogram in 5 years (2030)  No change in medications today  We will review labs once they are available and will call you if we recommend changes in treatment